# Patient Record
Sex: MALE | Race: WHITE | NOT HISPANIC OR LATINO | Employment: OTHER | URBAN - METROPOLITAN AREA
[De-identification: names, ages, dates, MRNs, and addresses within clinical notes are randomized per-mention and may not be internally consistent; named-entity substitution may affect disease eponyms.]

---

## 2021-07-21 ENCOUNTER — HOSPITAL ENCOUNTER (EMERGENCY)
Facility: HOSPITAL | Age: 51
Discharge: HOME | End: 2021-07-21
Attending: EMERGENCY MEDICINE | Admitting: EMERGENCY MEDICINE

## 2021-07-21 VITALS
RESPIRATION RATE: 20 BRPM | TEMPERATURE: 98.2 F | SYSTOLIC BLOOD PRESSURE: 132 MMHG | WEIGHT: 195.33 LBS | DIASTOLIC BLOOD PRESSURE: 80 MMHG | BODY MASS INDEX: 29.6 KG/M2 | HEART RATE: 84 BPM | HEIGHT: 68 IN | OXYGEN SATURATION: 100 %

## 2021-07-21 DIAGNOSIS — S01.311A LACERATION OF ANTIHELIX OF RIGHT EAR, INITIAL ENCOUNTER: Primary | ICD-10-CM

## 2021-07-21 PROCEDURE — 99283 EMERGENCY DEPT VISIT LOW MDM: CPT

## 2021-07-21 RX ORDER — LIDOCAINE HYDROCHLORIDE 10 MG/ML
10 INJECTION, SOLUTION EPIDURAL; INFILTRATION; INTRACAUDAL; PERINEURAL ONCE
Status: COMPLETED | OUTPATIENT
Start: 2021-07-21 | End: 2021-07-21

## 2021-07-21 RX ORDER — GINSENG 100 MG
CAPSULE ORAL ONCE
Status: COMPLETED | OUTPATIENT
Start: 2021-07-21 | End: 2021-07-21

## 2021-07-21 RX ADMIN — LIDOCAINE HYDROCHLORIDE 10 ML: 10 INJECTION, SOLUTION EPIDURAL; INFILTRATION; INTRACAUDAL; PERINEURAL at 16:22

## 2021-07-21 RX ADMIN — Medication 2 APPLICATION: at 16:23
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
## 2021-07-21 NOTE — DISCHARGE INSTRUCTIONS
Apply ice several times daily for the next few days, 20 mins on, 20 mins off. Clean daily, pat dry and apply neosporin. Take Ibuprofen or tylenol for pain. Have the sutures removed in 10 days (Call Dr Lion's office for an appt).

## 2021-08-04 ENCOUNTER — OFFICE VISIT (OUTPATIENT)
Dept: OTOLARYNGOLOGY | Facility: CLINIC | Age: 51
End: 2021-08-04

## 2021-08-04 VITALS — WEIGHT: 200.6 LBS | BODY MASS INDEX: 30.4 KG/M2 | TEMPERATURE: 97.7 F | HEIGHT: 68 IN

## 2021-08-04 DIAGNOSIS — H61.23 BILATERAL IMPACTED CERUMEN: ICD-10-CM

## 2021-08-04 DIAGNOSIS — S01.311A LACERATION OF RIGHT EAR, INITIAL ENCOUNTER: Primary | ICD-10-CM

## 2021-08-04 PROCEDURE — 99213 OFFICE O/P EST LOW 20 MIN: CPT | Performed by: NURSE PRACTITIONER

## 2021-08-04 PROCEDURE — 69210 REMOVE IMPACTED EAR WAX UNI: CPT | Performed by: NURSE PRACTITIONER

## 2021-08-04 RX ORDER — ESCITALOPRAM OXALATE 10 MG/1
TABLET ORAL
COMMUNITY
Start: 2021-05-06 | End: 2022-02-21

## 2021-08-04 RX ORDER — ALPRAZOLAM 0.25 MG/1
TABLET ORAL
COMMUNITY
Start: 2021-05-06 | End: 2022-02-21
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
## 2022-02-21 ENCOUNTER — OFFICE VISIT (OUTPATIENT)
Dept: INTERNAL MEDICINE | Facility: CLINIC | Age: 52
End: 2022-02-21

## 2022-02-21 VITALS
TEMPERATURE: 97.3 F | DIASTOLIC BLOOD PRESSURE: 80 MMHG | WEIGHT: 196 LBS | BODY MASS INDEX: 29.7 KG/M2 | HEART RATE: 66 BPM | OXYGEN SATURATION: 100 % | HEIGHT: 68 IN | RESPIRATION RATE: 12 BRPM | SYSTOLIC BLOOD PRESSURE: 110 MMHG

## 2022-02-21 DIAGNOSIS — K21.9 GASTROESOPHAGEAL REFLUX DISEASE WITHOUT ESOPHAGITIS: Primary | ICD-10-CM

## 2022-02-21 DIAGNOSIS — J30.9 ALLERGIC RHINITIS, UNSPECIFIED SEASONALITY, UNSPECIFIED TRIGGER: ICD-10-CM

## 2022-02-21 DIAGNOSIS — R51.9 CHRONIC NONINTRACTABLE HEADACHE, UNSPECIFIED HEADACHE TYPE: ICD-10-CM

## 2022-02-21 DIAGNOSIS — G89.29 CHRONIC NONINTRACTABLE HEADACHE, UNSPECIFIED HEADACHE TYPE: ICD-10-CM

## 2022-02-21 DIAGNOSIS — Z00.00 ANNUAL PHYSICAL EXAM: ICD-10-CM

## 2022-02-21 PROCEDURE — 99386 PREV VISIT NEW AGE 40-64: CPT | Performed by: NURSE PRACTITIONER

## 2022-02-21 PROCEDURE — 99214 OFFICE O/P EST MOD 30 MIN: CPT | Performed by: NURSE PRACTITIONER

## 2022-02-21 RX ORDER — CETIRIZINE HYDROCHLORIDE 10 MG/1
10 TABLET ORAL DAILY
Qty: 90 TABLET | Refills: 0 | Status: SHIPPED | OUTPATIENT
Start: 2022-02-21

## 2022-02-21 RX ORDER — FLUTICASONE PROPIONATE 50 MCG
2 SPRAY, SUSPENSION (ML) NASAL DAILY
Qty: 9.9 ML | Refills: 1 | Status: SHIPPED | OUTPATIENT
Start: 2022-02-21

## 2022-02-21 NOTE — ASSESSMENT & PLAN NOTE
Patient is open to trying a new regimen, we will start him on second-generation antihistamine and Flonase.  Patient will start this and let us know if this is decreasing his symptoms overall especially his headaches.  He will follow-up as needed.

## 2022-02-21 NOTE — ASSESSMENT & PLAN NOTE
Annual physical exam completed today, no significant abnormal findings.  No need for lab work at this time, we will get this at his next annual physical.  I did review previous lab work that was done.

## 2022-02-21 NOTE — ASSESSMENT & PLAN NOTE
This was passed medical diagnosis, he does not take any medications.  He was able to control to maintain his symptoms.  No need for any follow-up at this time.

## 2022-02-21 NOTE — ASSESSMENT & PLAN NOTE
Patient is describing headaches, these are happening 1-2 times weekly.  They typically are preceded by sinusitis type symptoms.  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  He is not around best as much as he used to be.  We are trying a regimen for his allergic rhinitis, we will see if this overall reduces the number of headaches he is getting.  Patient is open to this plan.  We will follow-up as needed.

## 2022-02-21 NOTE — PROGRESS NOTES
Chief Complaint  Establish Care and Annual Exam    Subjective         Scottie Lira presents to [facility] for Establishment of care and annual physical exam.  Patient also has concerned about allergies.    Patient is here to establish care, he recently moved to the area about 10 months ago, he was being seen by previous primary care office however he was not happy with the services being provided there.  That is what led him here.  Patient does not have a significant past medical history, he has chronic joint pains primarily in the shoulder and right arm.  He does not do anything regarding this.  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  He does not have any significant concerns at this time regarding this.  He states if it gets worse he will let us know.  Patient is here today primarily for his annual physical exam which has not been completed in over a year, he is also concerned about some allergy type symptoms and headaches.  He states that for decades he has had issues with chronic cough in the morning, he has never been a smoker, he does have postnasal drip he notices in the morning with some congestion.  It gets better throughout the day.  However, some days he gets significant headaches, these are chronic in nature.  He has been using Excedrin which will typically work for them.  He is hoping that there may be some medicines that may be able to reduce allergy symptoms which may in turn reduce his headaches.    Patient is up-to-date with his colon cancer screening, he is up-to-date with COVID-19 vaccine and shingles vaccine.  He brings in recent lab work that was done earlier this year, it also all normal, only significant issue was cholesterol which has been slightly elevated since he can remember.  He does exercise and tries to eat healthier.  Patient does not have any other significant concerns or complaints today.         Review of Systems  "  Constitutional: Negative for chills, fatigue and fever.   HENT: Positive for congestion, postnasal drip and rhinorrhea. Negative for sinus pressure, sinus pain, sneezing and sore throat.    Respiratory: Positive for cough. Negative for shortness of breath.    Cardiovascular: Negative for chest pain and palpitations.   Gastrointestinal: Negative for diarrhea, nausea and vomiting.   Musculoskeletal: Positive for myalgias.   Skin: Negative for rash.   Neurological: Positive for headaches.   Psychiatric/Behavioral: Negative for dysphoric mood and sleep disturbance. The patient is not nervous/anxious.        Objective   Vital Signs:   /80   Pulse 66   Temp 97.3 °F (36.3 °C) (Temporal)   Resp 12   Ht 172.7 cm (68\")   Wt 88.9 kg (196 lb)   SpO2 100%   BMI 29.80 kg/m²     Physical Exam  Vitals and nursing note reviewed.   Constitutional:       Appearance: Normal appearance. He is normal weight.   HENT:      Head: Normocephalic and atraumatic.      Right Ear: Tympanic membrane, ear canal and external ear normal.      Left Ear: Tympanic membrane, ear canal and external ear normal.      Nose: Nose normal.      Mouth/Throat:      Mouth: Mucous membranes are moist.      Pharynx: Oropharynx is clear.   Eyes:      Conjunctiva/sclera: Conjunctivae normal.      Pupils: Pupils are equal, round, and reactive to light.   Cardiovascular:      Rate and Rhythm: Normal rate and regular rhythm.   Pulmonary:      Effort: Pulmonary effort is normal.      Breath sounds: Normal breath sounds.   Neurological:      General: No focal deficit present.      Mental Status: He is alert.   Psychiatric:         Mood and Affect: Mood normal.         Thought Content: Thought content normal.         Judgment: Judgment normal.        Result Review :                   Diagnoses and all orders for this visit:    1. Gastroesophageal reflux disease without esophagitis (Primary)  Assessment & Plan:  This was passed medical diagnosis, he does not " take any medications.  He was able to control to maintain his symptoms.  No need for any follow-up at this time.      2. Allergic rhinitis, unspecified seasonality, unspecified trigger  Assessment & Plan:  Patient is open to trying a new regimen, we will start him on second-generation antihistamine and Flonase.  Patient will start this and let us know if this is decreasing his symptoms overall especially his headaches.  He will follow-up as needed.      3. Chronic nonintractable headache, unspecified headache type  Assessment & Plan:  Patient is describing headaches, these are happening 1-2 times weekly.  They typically are preceded by sinusitis type symptoms.  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  He is not around best as much as he used to be.  We are trying a regimen for his allergic rhinitis, we will see if this overall reduces the number of headaches he is getting.  Patient is open to this plan.  We will follow-up as needed.          4. Annual physical exam  Assessment & Plan:  Annual physical exam completed today, no significant abnormal findings.  No need for lab work at this time, we will get this at his next annual physical.  I did review previous lab work that was done.      Other orders  -     cetirizine (zyrTEC) 10 MG tablet; Take 1 tablet by mouth Daily.  Dispense: 90 tablet; Refill: 0  -     fluticasone (Flonase) 50 MCG/ACT nasal spray; 2 sprays into the nostril(s) as directed by provider Daily.  Dispense: 9.9 mL; Refill: 1        Follow Up   Return in about 1 year (around 2/21/2023) for Annual physical.  Patient was given instructions and counseling regarding his condition or for health maintenance advice. Please see specific information pulled into the AVS if appropriate.     Screening labs reviewed/ordered  Counseling provided regarding age appropriate screenings and immunizations, healthy diet and exercise.     Vijay Mei,  APRN  2/21/2022  This note was electronically signed.

## 2022-02-23 ENCOUNTER — PATIENT ROUNDING (BHMG ONLY) (OUTPATIENT)
Dept: INTERNAL MEDICINE | Facility: CLINIC | Age: 52
End: 2022-02-23

## 2022-02-23 NOTE — PROGRESS NOTES
February 23, 2022    Hello, may I speak with Scottie Lira?    My name is sona      I am  with [facility]  [address] [address] [phone].    Before we get started may I verify your date of birth? 1970    I am calling to officially welcome you to our practice and ask about your recent visit. Is this a good time to talk? yes    Tell me about your visit with us. What things went well?  everything went fine       We're always looking for ways to make our patients' experiences even better. Do you have recommendations on ways we may improve?  no    Overall were you satisfied with your first visit to our practice? yes       I appreciate you taking the time to speak with me today. Is there anything else I can do for you? Yes, haven't heard from pharmacy about medication, called [facility] pharmacy and meds were ready for . Patient aware.       Thank you, and have a great day.

## 2023-11-06 ENCOUNTER — APPOINTMENT (OUTPATIENT)
Dept: URBAN - METROPOLITAN AREA CLINIC 278 | Age: 53
Setting detail: DERMATOLOGY
End: 2023-11-06

## 2023-11-06 DIAGNOSIS — B07.8 OTHER VIRAL WARTS: ICD-10-CM

## 2023-11-06 DIAGNOSIS — Z71.89 OTHER SPECIFIED COUNSELING: ICD-10-CM

## 2023-11-06 PROCEDURE — OTHER COUNSELING: OTHER

## 2023-11-06 PROCEDURE — OTHER BENIGN DESTRUCTION: OTHER

## 2023-11-06 PROCEDURE — 99202 OFFICE O/P NEW SF 15 MIN: CPT | Mod: 25

## 2023-11-06 PROCEDURE — 17110 DESTRUCT B9 LESION 1-14: CPT

## 2023-11-06 PROCEDURE — OTHER MIPS QUALITY: OTHER

## 2023-11-06 PROCEDURE — OTHER SUNSCREEN RECOMMENDATIONS: OTHER

## 2023-11-06 ASSESSMENT — LOCATION ZONE DERM: LOCATION ZONE: ARM

## 2023-11-06 ASSESSMENT — TOTAL NUMBER OF LESIONS: # OF LESIONS?: 7

## 2023-11-06 ASSESSMENT — LOCATION SIMPLE DESCRIPTION DERM: LOCATION SIMPLE: LEFT FOREARM

## 2023-11-06 ASSESSMENT — AREA OF WARTS IN CM2: TOTAL AREA OF ALL WARTS IN CM2: 5

## 2023-11-06 ASSESSMENT — LOCATION DETAILED DESCRIPTION DERM: LOCATION DETAILED: LEFT PROXIMAL DORSAL FOREARM

## 2023-11-06 NOTE — PROCEDURE: BENIGN DESTRUCTION
Medical Necessity Information: It is in your best interest to select a reason for this procedure from the list below. All of these items fulfill various CMS LCD requirements except the new and changing color options.
Treatment Number (Will Not Render If 0): 1
Render Post-Care Instructions In Note?: no
Anesthesia Volume In Cc: 0.5
Detail Level: Detailed
Medical Necessity Clause: This procedure was medically necessary because the lesions that were treated were:
Post-Care Instructions: I reviewed with the patient in detail post-care instructions. Patient is to wear sunprotection, and avoid picking at any of the treated lesions. Pt may apply Vaseline to crusted or scabbing areas.
Consent: The patient's consent was obtained including but not limited to risks of crusting, scabbing, blistering, scarring, darker or lighter pigmentary change, recurrence, incomplete removal and infection.

## 2023-11-06 NOTE — HPI: RASH
How Severe Is Your Rash?: mild
Is This A New Presentation, Or A Follow-Up?: Rash
Additional History: Rash that gets worst in the summer with heat and comes and goes but it spreads up and down the arm.

## 2023-11-06 NOTE — PROCEDURE: SUNSCREEN RECOMMENDATIONS
Products Recommended: Solbar zinc spf 38.
General Sunscreen Counseling: I recommended a broad spectrum sunscreen with a SPF of 30 or higher.  I explained that SPF 30 sunscreens block approximately 97 percent of the sun's harmful rays.  Sunscreens should be applied at least 15 minutes prior to expected sun exposure and then every 2 hours after that as long as sun exposure continues. If swimming or exercising sunscreen should be reapplied every 45 minutes to an hour after getting wet or sweating.  One ounce, or the equivalent of a shot glass full of sunscreen, is adequate to protect the skin not covered by a bathing suit. I also recommended a lip balm with a sunscreen as well. Sun protective clothing can be used in lieu of sunscreen but must be worn the entire time you are exposed to the sun's rays.
Detail Level: Detailed